# Patient Record
Sex: MALE | Race: WHITE | NOT HISPANIC OR LATINO | Employment: FULL TIME | URBAN - METROPOLITAN AREA
[De-identification: names, ages, dates, MRNs, and addresses within clinical notes are randomized per-mention and may not be internally consistent; named-entity substitution may affect disease eponyms.]

---

## 2019-03-16 ENCOUNTER — TRANSFERRED RECORDS (OUTPATIENT)
Dept: HEALTH INFORMATION MANAGEMENT | Facility: CLINIC | Age: 49
End: 2019-03-16

## 2019-03-16 ENCOUNTER — APPOINTMENT (OUTPATIENT)
Dept: GENERAL RADIOLOGY | Facility: CLINIC | Age: 49
End: 2019-03-16
Attending: EMERGENCY MEDICINE
Payer: COMMERCIAL

## 2019-03-16 ENCOUNTER — HOSPITAL ENCOUNTER (EMERGENCY)
Facility: CLINIC | Age: 49
Discharge: HOME | End: 2019-03-16
Attending: EMERGENCY MEDICINE | Admitting: EMERGENCY MEDICINE
Payer: COMMERCIAL

## 2019-03-16 VITALS
TEMPERATURE: 98.5 F | SYSTOLIC BLOOD PRESSURE: 97 MMHG | OXYGEN SATURATION: 96 % | HEIGHT: 71 IN | WEIGHT: 215 LBS | HEART RATE: 53 BPM | RESPIRATION RATE: 10 BRPM | DIASTOLIC BLOOD PRESSURE: 73 MMHG | BODY MASS INDEX: 30.1 KG/M2

## 2019-03-16 DIAGNOSIS — R07.89 ATYPICAL CHEST PAIN: ICD-10-CM

## 2019-03-16 LAB
ALBUMIN SERPL-MCNC: 3.7 G/DL (ref 3.4–5)
ALP SERPL-CCNC: 71 U/L (ref 40–150)
ALT SERPL W P-5'-P-CCNC: 37 U/L (ref 0–70)
ANION GAP SERPL CALCULATED.3IONS-SCNC: 6 MMOL/L (ref 3–14)
AST SERPL W P-5'-P-CCNC: 21 U/L (ref 0–45)
BASOPHILS # BLD AUTO: 0 10E9/L (ref 0–0.2)
BASOPHILS NFR BLD AUTO: 0.4 %
BILIRUB SERPL-MCNC: 0.4 MG/DL (ref 0.2–1.3)
BUN SERPL-MCNC: 12 MG/DL (ref 7–30)
CALCIUM SERPL-MCNC: 8.1 MG/DL (ref 8.5–10.1)
CHLORIDE SERPL-SCNC: 107 MMOL/L (ref 94–109)
CO2 SERPL-SCNC: 26 MMOL/L (ref 20–32)
CREAT SERPL-MCNC: 0.96 MG/DL (ref 0.66–1.25)
DIFFERENTIAL METHOD BLD: ABNORMAL
EOSINOPHIL # BLD AUTO: 0.3 10E9/L (ref 0–0.7)
EOSINOPHIL NFR BLD AUTO: 5.3 %
ERYTHROCYTE [DISTWIDTH] IN BLOOD BY AUTOMATED COUNT: 13 % (ref 10–15)
GFR SERPL CREATININE-BSD FRML MDRD: >90 ML/MIN/{1.73_M2}
GLUCOSE SERPL-MCNC: 114 MG/DL (ref 70–99)
HCT VFR BLD AUTO: 36.6 % (ref 40–53)
HGB BLD-MCNC: 12.5 G/DL (ref 13.3–17.7)
IMM GRANULOCYTES # BLD: 0 10E9/L (ref 0–0.4)
IMM GRANULOCYTES NFR BLD: 0 %
LYMPHOCYTES # BLD AUTO: 2.2 10E9/L (ref 0.8–5.3)
LYMPHOCYTES NFR BLD AUTO: 43.7 %
MCH RBC QN AUTO: 29.6 PG (ref 26.5–33)
MCHC RBC AUTO-ENTMCNC: 34.2 G/DL (ref 31.5–36.5)
MCV RBC AUTO: 87 FL (ref 78–100)
MONOCYTES # BLD AUTO: 0.4 10E9/L (ref 0–1.3)
MONOCYTES NFR BLD AUTO: 7.6 %
NEUTROPHILS # BLD AUTO: 2.2 10E9/L (ref 1.6–8.3)
NEUTROPHILS NFR BLD AUTO: 43 %
NRBC # BLD AUTO: 0 10*3/UL
NRBC BLD AUTO-RTO: 0 /100
PLATELET # BLD AUTO: 184 10E9/L (ref 150–450)
POTASSIUM SERPL-SCNC: 3.6 MMOL/L (ref 3.4–5.3)
PROT SERPL-MCNC: 7 G/DL (ref 6.8–8.8)
RBC # BLD AUTO: 4.22 10E12/L (ref 4.4–5.9)
SODIUM SERPL-SCNC: 139 MMOL/L (ref 133–144)
TROPONIN I SERPL-MCNC: <0.015 UG/L (ref 0–0.04)
TROPONIN I SERPL-MCNC: <0.015 UG/L (ref 0–0.04)
WBC # BLD AUTO: 5.1 10E9/L (ref 4–11)

## 2019-03-16 PROCEDURE — 71046 X-RAY EXAM CHEST 2 VIEWS: CPT

## 2019-03-16 PROCEDURE — 93005 ELECTROCARDIOGRAM TRACING: CPT

## 2019-03-16 PROCEDURE — 99285 EMERGENCY DEPT VISIT HI MDM: CPT | Mod: 25

## 2019-03-16 PROCEDURE — 85025 COMPLETE CBC W/AUTO DIFF WBC: CPT | Performed by: EMERGENCY MEDICINE

## 2019-03-16 PROCEDURE — 84484 ASSAY OF TROPONIN QUANT: CPT | Performed by: EMERGENCY MEDICINE

## 2019-03-16 PROCEDURE — 80053 COMPREHEN METABOLIC PANEL: CPT | Performed by: EMERGENCY MEDICINE

## 2019-03-16 RX ORDER — PANTOPRAZOLE SODIUM 40 MG/1
40 TABLET, DELAYED RELEASE ORAL DAILY
Qty: 30 TABLET | Refills: 0 | Status: SHIPPED | OUTPATIENT
Start: 2019-03-16 | End: 2019-04-15

## 2019-03-16 ASSESSMENT — ENCOUNTER SYMPTOMS
NECK PAIN: 1
DIAPHORESIS: 0
SHORTNESS OF BREATH: 0
BACK PAIN: 1
DIZZINESS: 0
ARTHRALGIAS: 1

## 2019-03-16 ASSESSMENT — MIFFLIN-ST. JEOR: SCORE: 1862.36

## 2019-03-16 NOTE — ED NOTES
Bed: ED20  Expected date: 3/16/19  Expected time: 5:15 PM  Means of arrival: Ambulance  Comments:  Mary Kay 48m back pain, MI?  [id number]

## 2019-03-16 NOTE — ED PROVIDER NOTES
History     Chief Complaint:  Left Shoulder Pain     The history is provided by the patient. The history is limited by a language barrier.    further info obtained once son arrived.  Magnus Uribe is a 48 year old male with past medical history significant for myocardial infarction ten years ago who presents via EMS with two days of left posterior shoulder and upper left quadrant back pain. He denies worsening of this pain with movement and exertion. Per nurse, patient also noted radiating left-sided neck discomfort. He also reports minimal left-sided chest discomfort. Patient endorses relief of his discomfort after administration of Aspirin and Nitroglycerin in clinic prior to arrival. He states he does not remember his constellation of his symptoms with his last MI but his son states they brought him for evaluation at that time due to patient appearing pale. Patient denies any shortness of breath, diaphoresis, dizziness, recent strain/trauma, history of similar symptoms, or personal history of hypertension, hyperlipidemia, and diabetes. Patient denies taking any antipyretics prior to arrival.     Allergies:  No Known Drug Allergies    Medications:    Levothyroxine    Past Medical History:    Thyroid problem   Myocardial infarction (>10 years ago)     Past Surgical History:    Denies previous surgical interventions.     Family History:    Father: myocardial infarction at age 50     Social History:  Patient presents with his son.  Preferred language: Russian  Employment:   Smoking Status: Never Smoker     Review of Systems   Constitutional: Negative for diaphoresis.   Respiratory: Negative for shortness of breath.    Cardiovascular: Positive for chest pain (minimal).   Musculoskeletal: Positive for arthralgias (L shoulder), back pain and neck pain (L sided).   Neurological: Negative for dizziness.   All other systems reviewed and are negative.    Physical Exam     Patient Vitals for the past 24  "hrs:   BP Temp Temp src Pulse Heart Rate Resp SpO2 Height Weight   03/16/19 2000 97/73 -- -- 53 64 10 96 % -- --   03/16/19 1930 99/74 -- -- 56 51 18 95 % -- --   03/16/19 1900 109/79 -- -- 59 56 17 94 % -- --   03/16/19 1830 108/82 -- -- 58 60 10 94 % -- --   03/16/19 1755 -- -- -- -- 60 18 94 % -- --   03/16/19 1738 104/81 98.5  F (36.9  C) Oral 61 -- 18 94 % 1.803 m (5' 11\") 97.5 kg (215 lb)     Physical Exam  Constitutional: White male supine  HENT: No signs of trauma.   Eyes: EOM are normal. Pupils are equal, round, and reactive to light.   Neck: Normal range of motion. No JVD present. No cervical adenopathy.  Cardiovascular: Regular rhythm.  Exam reveals no gallop and no friction rub. No murmur heard. 2+ radial and femoral pulses bilaterally.  Pulmonary/Chest: Bilateral breath sounds normal. No wheezes, rhonchi or rales.  Abdominal: Soft. No tenderness. No rebound or guarding.   Musculoskeletal: No edema. No tenderness. No discomfort with moving left arm or palpation of left shoulder.    Lymphadenopathy: No lymphadenopathy.   Neurological: Alert and oriented to person, place, and time. Normal strength. Coordination normal.   Skin: Skin is warm and dry. No rash noted. No erythema.     Emergency Department Course     ECG:  ECG taken at 1733  Sinus bradycardia  Nonspecific T wave changes  Rate 59 bpm. NE interval 146 ms. QRS duration 82 ms. QT/QTc 392/388 ms. P-R-T axes 26 10 55.    Imaging:  Radiology findings were communicated with the patient who voiced understanding of the findings.    XR Chest 2 Views  Normal.   Reading per radiology    Laboratory:  Laboratory findings were communicated with the patient who voiced understanding of the findings.    CBC: WBC 5.1, HGB 12.5,   CMP: glucose 114, Ca 8.1 o/w WNL (Creatinine 0.96)  Troponin (Collected 1730): <0.015  Troponin (Collected 1929): <0.015    Emergency Department Course:  1730 IV was inserted and blood was drawn for laboratory testing, results " above.    1733 EKG obtained in the ED, see results above.     1736 Nursing notes and vitals reviewed.    1738 I performed an exam of the patient as documented above.     1750 The patient was sent for a Chest XR while in the emergency department, results above.     1854 Patient rechecked and updated.      2018 Patient rechecked and updated. I personally reviewed the imaging and laboratory results with the patient and answered all related questions prior to discharge.    Impression & Plan      Medical Decision Makin years old male who presents to the emergency department complaining of upper left shoulder pain. This has been going for the past two to three days without any associated symptoms. He went to urgent care where they thought there were some concerns with his EKG. Gave him Aspirin and Nitro and had him come by ambulance. Reportedly, he had small heart attack 10 years ago, is not on any medications now. He also has a family history of heart disease. On examination, patient has no pain at this time although when I move around and push on his left shoulder, it does not bother him. His EKG shows some minimal nonspecific T wave changes. I have no old EKG to compare this to unfortunately. Workup includes chest x-ray and troponin x2 in 2.5 hours interval, this is all normal. Other lab workup is also normal. Patient also has some stress in that his daughter has just been admitted to the hospital few days ago and had to be intubated. Chest pain is atypical, I do not think this is an acute MI. Could not entirely rule out angina. I do not think this is PE or dissection. I will request an outpatient stress ECHO in the next few days. Patient should maintain on Asprin daily and will start on Protonix 40 mg QD. If symptoms worsen, return to the ED. Also, I ve referred him to on-call physician Dr. Hanley to arrange follow-up or with his own doctor.     Diagnosis:    ICD-10-CM   1. Atypical chest pain R07.89      Disposition:   The patient is discharged to home.    Discharge Medications:       Dose / Directions   pantoprazole 40 MG EC tablet  Commonly known as:  PROTONIX      Dose:  40 mg  Take 1 tablet (40 mg) by mouth daily for 30 doses  Quantity:  30 tablet  Refills:  0       Scribe Disclosure:  Julio César GUILLEN, am serving as a scribe at 5:36 PM on 3/16/2019 to document services personally performed by Prince Marin MD based on my observations and the provider's statements to me.     EMERGENCY DEPARTMENT       Prince Marin MD  03/17/19 0012

## 2019-03-16 NOTE — ED AVS SNAPSHOT
Emergency Department  [address] [place] [address]  Phone:  [phone]  Fax:  [phone]                                    Magnus Uribe   MRN: [id number]    Department:   Emergency Department   Date of Visit:  3/16/2019           After Visit Summary Signature Page    I have received my discharge instructions, and my questions have been answered. I have discussed any challenges I see with this plan with the nurse or doctor.    ..........................................................................................................................................  Patient/Patient Representative Signature      ..........................................................................................................................................  Patient Representative Print Name and Relationship to Patient    ..................................................               ................................................  Date                                   Time    ..........................................................................................................................................  Reviewed by Signature/Title    ...................................................              ..............................................  Date                                               Time          22EPIC Rev 08/18

## 2019-03-17 LAB — INTERPRETATION ECG - MUSE: NORMAL

## 2023-04-20 ENCOUNTER — OFFICE VISIT (OUTPATIENT)
Dept: OPHTHALMOLOGY | Facility: CLINIC | Age: 53
End: 2023-04-20
Attending: OPHTHALMOLOGY
Payer: COMMERCIAL

## 2023-04-20 DIAGNOSIS — H52.7 REFRACTIVE ERROR: ICD-10-CM

## 2023-04-20 DIAGNOSIS — Z13.5 SCREENING FOR EYE CONDITION: Primary | ICD-10-CM

## 2023-04-20 PROCEDURE — G0463 HOSPITAL OUTPT CLINIC VISIT: HCPCS | Performed by: OPHTHALMOLOGY

## 2023-04-20 PROCEDURE — 92004 COMPRE OPH EXAM NEW PT 1/>: CPT | Performed by: OPHTHALMOLOGY

## 2023-04-20 PROCEDURE — 92015 DETERMINE REFRACTIVE STATE: CPT

## 2023-04-20 RX ORDER — DICLOFENAC SODIUM 75 MG/1
75 TABLET, DELAYED RELEASE ORAL DAILY PRN
COMMUNITY
Start: 2022-05-01

## 2023-04-20 RX ORDER — THYROID, PORCINE 120 MG/1
1 TABLET ORAL DAILY
COMMUNITY
Start: 2023-03-01

## 2023-04-20 ASSESSMENT — VISUAL ACUITY
OD_SC: 20/20
OS_SC: J3
METHOD: SNELLEN - LINEAR
OD_SC: J3
OD_SC+: -2
OS_SC: 20/20

## 2023-04-20 ASSESSMENT — TONOMETRY
IOP_METHOD: TONOPEN
OS_IOP_MMHG: 15
OD_IOP_MMHG: 15

## 2023-04-20 ASSESSMENT — CONF VISUAL FIELD
OD_INFERIOR_NASAL_RESTRICTION: 0
OD_NORMAL: 1
OD_SUPERIOR_TEMPORAL_RESTRICTION: 0
OD_SUPERIOR_NASAL_RESTRICTION: 0
METHOD: COUNTING FINGERS
OD_INFERIOR_TEMPORAL_RESTRICTION: 0

## 2023-04-20 ASSESSMENT — REFRACTION_MANIFEST
OD_CYLINDER: SPHERE
OD_SPHERE: -0.25
OS_SPHERE: -0.25
OS_CYLINDER: SPHERE
OS_ADD: +2.25
OD_ADD: +2.25

## 2023-04-20 ASSESSMENT — SLIT LAMP EXAM - LIDS
COMMENTS: NORMAL
COMMENTS: NORMAL

## 2023-04-20 ASSESSMENT — EXTERNAL EXAM - RIGHT EYE: OD_EXAM: NORMAL

## 2023-04-20 ASSESSMENT — EXTERNAL EXAM - LEFT EYE: OS_EXAM: NORMAL

## 2023-04-20 NOTE — PROGRESS NOTES
Chief Complaint(s) and History of Present Illness(es)     Annual Eye Exam            Laterality: both eyes    Onset: gradual    Associated symptoms: Negative for dryness, eye pain, tearing, flashes,   floaters and itching    Pain scale: 0/10          Comments    Magnus is here new to clinic for a complete eye exam. He has never had an   eye exam before. Near vision is blurry, but he feels he sees well in the   distance..     Edgar Reyes COT 1:49 PM April 20, 2023                Review of systems for the eyes was negative other than the pertinent positives/negatives listed in the HPI.      Assessment & Plan      Magnus Uribe is a 53 year old male with the following diagnoses:   1. Screening for eye condition    2. Refractive error         History obtained via interpretor   New patient here with his wife.  First eye exam   Noting some occasional difficulty at near.  Borrowing his wife's reading glasses as needed     Healthy dilated fundus exam   Discussed presbyopia  Refractive options reviewed  Refraction given     Patient disposition:   Return in about 2 years (around 4/20/2025) for DFE.           Attending Physician Attestation:  Complete documentation of historical and exam elements from today's encounter can be found in the full encounter summary report (not reduplicated in this progress note).  I personally obtained the chief complaint(s) and history of present illness.  I confirmed and edited as necessary the review of systems, past medical/surgical history, family history, social history, and examination findings as documented by others; and I examined the patient myself.  I personally reviewed the relevant tests, images, and reports as documented above.  I formulated and edited as necessary the assessment and plan and discussed the findings and management plan with the patient and family. . - Jacques Chavira MD

## 2023-04-20 NOTE — NURSING NOTE
Chief Complaints and History of Present Illnesses   Patient presents with     Annual Eye Exam     Chief Complaint(s) and History of Present Illness(es)     Annual Eye Exam            Laterality: both eyes    Onset: gradual    Associated symptoms: Negative for dryness, eye pain, tearing, flashes, floaters and itching    Pain scale: 0/10          Comments    Magnus is here new to clinic for a complete eye exam. He has never had an eye exam before. Near vision is blurry, but he feels he sees well in the distance..     Edgar Reyes COT 1:49 PM April 20, 2023